# Patient Record
(demographics unavailable — no encounter records)

---

## 2020-02-25 NOTE — RAD
EXAM:

CHEST TWO VIEWS



2/25/2020 3:28 PM



HISTORY:

Hypertension



COMPARISON:

None.



FINDINGS:



Lungs:  No acute airspace consolidation.



Heart:  Hypoventilation accentuates the cardiac silhouette.



Pulmonary Vessels: Normal.



Costophrenic Angles: Clear.



Pneumothorax: None.



Osseous Structures:  Intact.



Additional Findings:   None.



IMPRESSION:

No significant acute intrathoracic disease.



Reported By: Sukhwinder Medina 

Electronically Signed:  2/25/2020 3:28 PM